# Patient Record
Sex: FEMALE | Race: WHITE | Employment: OTHER | URBAN - METROPOLITAN AREA
[De-identification: names, ages, dates, MRNs, and addresses within clinical notes are randomized per-mention and may not be internally consistent; named-entity substitution may affect disease eponyms.]

---

## 2018-01-01 ENCOUNTER — INITIAL CONSULT (OUTPATIENT)
Dept: CARDIOTHORACIC SURGERY | Age: 63
End: 2018-01-01
Payer: COMMERCIAL

## 2018-01-01 ENCOUNTER — HOSPITAL ENCOUNTER (INPATIENT)
Age: 63
LOS: 1 days | DRG: 070 | End: 2018-10-06
Attending: EMERGENCY MEDICINE | Admitting: INTERNAL MEDICINE
Payer: COMMERCIAL

## 2018-01-01 ENCOUNTER — TELEPHONE (OUTPATIENT)
Dept: ONCOLOGY | Age: 63
End: 2018-01-01

## 2018-01-01 ENCOUNTER — HOSPITAL ENCOUNTER (OUTPATIENT)
Dept: RADIATION ONCOLOGY | Age: 63
Discharge: HOME | End: 2018-09-05
Payer: COMMERCIAL

## 2018-01-01 ENCOUNTER — TELEPHONE (OUTPATIENT)
Dept: RADIATION ONCOLOGY | Age: 63
End: 2018-01-01

## 2018-01-01 ENCOUNTER — HOSPITAL ENCOUNTER (OUTPATIENT)
Age: 63
Discharge: HOME | End: 2018-05-08
Payer: COMMERCIAL

## 2018-01-01 ENCOUNTER — HOSPITAL ENCOUNTER (OUTPATIENT)
Dept: RADIATION ONCOLOGY | Age: 63
Discharge: HOME | End: 2018-09-12
Payer: COMMERCIAL

## 2018-01-01 ENCOUNTER — HOSPITAL ENCOUNTER (OUTPATIENT)
Dept: RADIATION ONCOLOGY | Age: 63
Discharge: HOME | End: 2018-08-23
Payer: COMMERCIAL

## 2018-01-01 ENCOUNTER — HOSPITAL ENCOUNTER (OUTPATIENT)
Dept: RADIATION ONCOLOGY | Age: 63
Discharge: HOME | End: 2018-08-24
Payer: COMMERCIAL

## 2018-01-01 ENCOUNTER — HOSPITAL ENCOUNTER (OUTPATIENT)
Dept: RADIATION ONCOLOGY | Age: 63
Discharge: HOME | End: 2018-08-29
Payer: COMMERCIAL

## 2018-01-01 ENCOUNTER — HOSPITAL ENCOUNTER (OUTPATIENT)
Dept: RADIATION ONCOLOGY | Age: 63
Discharge: HOME | End: 2018-09-03
Payer: COMMERCIAL

## 2018-01-01 ENCOUNTER — APPOINTMENT (OUTPATIENT)
Dept: CT IMAGING | Age: 63
DRG: 070 | End: 2018-01-01
Payer: COMMERCIAL

## 2018-01-01 ENCOUNTER — TELEPHONE (OUTPATIENT)
Dept: INFUSION THERAPY | Age: 63
End: 2018-01-01

## 2018-01-01 ENCOUNTER — APPOINTMENT (OUTPATIENT)
Dept: GENERAL RADIOLOGY | Age: 63
DRG: 070 | End: 2018-01-01
Payer: COMMERCIAL

## 2018-01-01 ENCOUNTER — HOSPITAL ENCOUNTER (OUTPATIENT)
Age: 63
Discharge: HOME | End: 2018-04-10
Payer: COMMERCIAL

## 2018-01-01 VITALS
OXYGEN SATURATION: 98 % | TEMPERATURE: 97.5 F | DIASTOLIC BLOOD PRESSURE: 84 MMHG | BODY MASS INDEX: 19.74 KG/M2 | SYSTOLIC BLOOD PRESSURE: 120 MMHG | WEIGHT: 115 LBS | HEART RATE: 92 BPM

## 2018-01-01 VITALS
RESPIRATION RATE: 22 BRPM | SYSTOLIC BLOOD PRESSURE: 107 MMHG | HEIGHT: 65 IN | TEMPERATURE: 98.4 F | DIASTOLIC BLOOD PRESSURE: 70 MMHG | HEART RATE: 110 BPM | BODY MASS INDEX: 20.89 KG/M2 | OXYGEN SATURATION: 80 % | WEIGHT: 125.4 LBS

## 2018-01-01 VITALS
SYSTOLIC BLOOD PRESSURE: 108 MMHG | DIASTOLIC BLOOD PRESSURE: 70 MMHG | HEART RATE: 100 BPM | WEIGHT: 111.5 LBS | BODY MASS INDEX: 19.75 KG/M2

## 2018-01-01 VITALS
OXYGEN SATURATION: 98 % | TEMPERATURE: 97.6 F | SYSTOLIC BLOOD PRESSURE: 104 MMHG | HEART RATE: 95 BPM | WEIGHT: 112 LBS | BODY MASS INDEX: 19.84 KG/M2 | DIASTOLIC BLOOD PRESSURE: 76 MMHG

## 2018-01-01 VITALS
TEMPERATURE: 97.5 F | HEART RATE: 80 BPM | WEIGHT: 114 LBS | BODY MASS INDEX: 19.57 KG/M2 | SYSTOLIC BLOOD PRESSURE: 120 MMHG | OXYGEN SATURATION: 98 % | DIASTOLIC BLOOD PRESSURE: 82 MMHG

## 2018-01-01 VITALS
BODY MASS INDEX: 19.84 KG/M2 | SYSTOLIC BLOOD PRESSURE: 131 MMHG | HEIGHT: 63 IN | HEART RATE: 83 BPM | WEIGHT: 112 LBS | DIASTOLIC BLOOD PRESSURE: 86 MMHG

## 2018-01-01 VITALS
OXYGEN SATURATION: 96 % | SYSTOLIC BLOOD PRESSURE: 124 MMHG | HEART RATE: 92 BPM | DIASTOLIC BLOOD PRESSURE: 86 MMHG | TEMPERATURE: 97.9 F

## 2018-01-01 DIAGNOSIS — C80.1 CANCER (HCC): Primary | ICD-10-CM

## 2018-01-01 DIAGNOSIS — R62.7 FAILURE TO THRIVE IN ADULT: ICD-10-CM

## 2018-01-01 DIAGNOSIS — C34.91 MALIGNANT NEOPLASM OF RIGHT LUNG, UNSPECIFIED PART OF LUNG (HCC): Primary | ICD-10-CM

## 2018-01-01 DIAGNOSIS — J90 PLEURAL EFFUSION: Primary | ICD-10-CM

## 2018-01-01 DIAGNOSIS — E44.0 MODERATE PROTEIN-CALORIE MALNUTRITION (HCC): Primary | ICD-10-CM

## 2018-01-01 DIAGNOSIS — M62.81 GENERALIZED MUSCLE WEAKNESS: ICD-10-CM

## 2018-01-01 DIAGNOSIS — E86.0 DEHYDRATION: ICD-10-CM

## 2018-01-01 DIAGNOSIS — G93.40 ACUTE ENCEPHALOPATHY: ICD-10-CM

## 2018-01-01 LAB
ALBUMIN SERPL-MCNC: 1.8 G/DL (ref 3.5–5.2)
ALBUMIN SERPL-MCNC: 2 G/DL (ref 3.5–5.2)
ALBUMIN SERPL-MCNC: 2.9 G/DL (ref 3.5–5.2)
ALP BLD-CCNC: 104 U/L (ref 35–104)
ALP BLD-CCNC: 107 U/L (ref 35–104)
ALP BLD-CCNC: 75 U/L (ref 35–104)
ALT SERPL-CCNC: 15 U/L (ref 0–32)
ALT SERPL-CCNC: 15 U/L (ref 0–32)
ALT SERPL-CCNC: 35 U/L (ref 0–32)
AMMONIA: 31.1 UMOL/L (ref 11–51)
ANION GAP SERPL CALCULATED.3IONS-SCNC: 11 MMOL/L (ref 7–16)
ANION GAP SERPL CALCULATED.3IONS-SCNC: 7 MMOL/L (ref 7–16)
ANION GAP SERPL CALCULATED.3IONS-SCNC: 9 MMOL/L (ref 7–16)
ANION GAP SERPL CALCULATED.3IONS-SCNC: 9 MMOL/L (ref 7–16)
AST SERPL-CCNC: 12 U/L (ref 0–31)
AST SERPL-CCNC: 16 U/L (ref 0–31)
AST SERPL-CCNC: 39 U/L (ref 0–31)
B.E.: -1.2 MMOL/L (ref -3–3)
BACTERIA: ABNORMAL /HPF
BACTERIA: NORMAL /HPF
BASOPHILS ABSOLUTE: 0 E9/L (ref 0–0.2)
BASOPHILS ABSOLUTE: 0.03 E9/L (ref 0–0.2)
BASOPHILS RELATIVE PERCENT: 0 % (ref 0–2)
BASOPHILS RELATIVE PERCENT: 0.6 % (ref 0–2)
BILIRUB SERPL-MCNC: 0.5 MG/DL (ref 0–1.2)
BILIRUB SERPL-MCNC: 0.9 MG/DL (ref 0–1.2)
BILIRUB SERPL-MCNC: 0.9 MG/DL (ref 0–1.2)
BILIRUBIN URINE: NEGATIVE
BILIRUBIN URINE: NEGATIVE
BLOOD, URINE: NEGATIVE
BLOOD, URINE: NEGATIVE
BUN BLDV-MCNC: 23 MG/DL (ref 8–23)
BUN BLDV-MCNC: 24 MG/DL (ref 8–23)
BUN BLDV-MCNC: 27 MG/DL (ref 8–23)
BUN BLDV-MCNC: 32 MG/DL (ref 8–23)
BURR CELLS: ABNORMAL
CALCIUM SERPL-MCNC: 8.1 MG/DL (ref 8.6–10.2)
CALCIUM SERPL-MCNC: 8.6 MG/DL (ref 8.6–10.2)
CALCIUM SERPL-MCNC: 8.8 MG/DL (ref 8.6–10.2)
CALCIUM SERPL-MCNC: 8.8 MG/DL (ref 8.6–10.2)
CHLORIDE BLD-SCNC: 108 MMOL/L (ref 98–107)
CHLORIDE BLD-SCNC: 109 MMOL/L (ref 98–107)
CHLORIDE BLD-SCNC: 99 MMOL/L (ref 98–107)
CHLORIDE BLD-SCNC: 99 MMOL/L (ref 98–107)
CLARITY: CLEAR
CLARITY: CLEAR
CO2: 21 MMOL/L (ref 22–29)
CO2: 25 MMOL/L (ref 22–29)
CO2: 30 MMOL/L (ref 22–29)
CO2: 31 MMOL/L (ref 22–29)
COHB: 1.3 % (ref 0–1.5)
COLOR: YELLOW
COLOR: YELLOW
CREAT SERPL-MCNC: 0.7 MG/DL (ref 0.5–1)
CREAT SERPL-MCNC: 0.8 MG/DL (ref 0.5–1)
CREAT SERPL-MCNC: 1.5 MG/DL (ref 0.5–1)
CREAT SERPL-MCNC: 1.7 MG/DL (ref 0.5–1)
CREATININE URINE: 256 MG/DL (ref 29–226)
CRITICAL: ABNORMAL
DATE ANALYZED: ABNORMAL
DATE OF COLLECTION: ABNORMAL
EOSINOPHILS ABSOLUTE: 0.06 E9/L (ref 0.05–0.5)
EOSINOPHILS ABSOLUTE: 0.16 E9/L (ref 0.05–0.5)
EOSINOPHILS RELATIVE PERCENT: 1 % (ref 0–6)
EOSINOPHILS RELATIVE PERCENT: 2.9 % (ref 0–6)
GFR AFRICAN AMERICAN: 37
GFR AFRICAN AMERICAN: 42
GFR AFRICAN AMERICAN: >60
GFR AFRICAN AMERICAN: >60
GFR NON-AFRICAN AMERICAN: 30 ML/MIN/1.73
GFR NON-AFRICAN AMERICAN: 35 ML/MIN/1.73
GFR NON-AFRICAN AMERICAN: >60 ML/MIN/1.73
GFR NON-AFRICAN AMERICAN: >60 ML/MIN/1.73
GLUCOSE BLD-MCNC: 74 MG/DL (ref 74–109)
GLUCOSE BLD-MCNC: 77 MG/DL (ref 74–109)
GLUCOSE BLD-MCNC: 79 MG/DL (ref 74–109)
GLUCOSE BLD-MCNC: 97 MG/DL (ref 74–109)
GLUCOSE URINE: NEGATIVE MG/DL
GLUCOSE URINE: NEGATIVE MG/DL
HCO3: 22.3 MMOL/L (ref 22–26)
HCT VFR BLD CALC: 38.8 % (ref 34–48)
HCT VFR BLD CALC: 39.1 % (ref 34–48)
HCT VFR BLD CALC: 44.7 % (ref 34–48)
HEMOGLOBIN: 12.1 G/DL (ref 11.5–15.5)
HEMOGLOBIN: 12.6 G/DL (ref 11.5–15.5)
HEMOGLOBIN: 14.2 G/DL (ref 11.5–15.5)
HHB: 3.7 % (ref 0–5)
IMMATURE GRANULOCYTES #: 0.03 E9/L
IMMATURE GRANULOCYTES %: 0.6 % (ref 0–5)
INR BLD: 1.5
KETONES, URINE: ABNORMAL MG/DL
KETONES, URINE: NEGATIVE MG/DL
LAB: ABNORMAL
LACTIC ACID: 0.8 MMOL/L (ref 0.5–2.2)
LEUKOCYTE ESTERASE, URINE: ABNORMAL
LEUKOCYTE ESTERASE, URINE: ABNORMAL
LYMPHOCYTES ABSOLUTE: 0.12 E9/L (ref 1.5–4)
LYMPHOCYTES ABSOLUTE: 0.67 E9/L (ref 1.5–4)
LYMPHOCYTES RELATIVE PERCENT: 12.3 % (ref 20–42)
LYMPHOCYTES RELATIVE PERCENT: 2 % (ref 20–42)
Lab: ABNORMAL
MAGNESIUM: 2 MG/DL (ref 1.6–2.6)
MAGNESIUM: 2.1 MG/DL (ref 1.6–2.6)
MAGNESIUM: 2.4 MG/DL (ref 1.6–2.6)
MCH RBC QN AUTO: 29.5 PG (ref 26–35)
MCH RBC QN AUTO: 29.6 PG (ref 26–35)
MCH RBC QN AUTO: 30.6 PG (ref 26–35)
MCHC RBC AUTO-ENTMCNC: 31.2 % (ref 32–34.5)
MCHC RBC AUTO-ENTMCNC: 31.8 % (ref 32–34.5)
MCHC RBC AUTO-ENTMCNC: 32.2 % (ref 32–34.5)
MCV RBC AUTO: 92.7 FL (ref 80–99.9)
MCV RBC AUTO: 94.9 FL (ref 80–99.9)
MCV RBC AUTO: 94.9 FL (ref 80–99.9)
METER GLUCOSE: 65 MG/DL (ref 70–110)
METHB: 0.3 % (ref 0–1.5)
MODE: ABNORMAL
MONOCYTES ABSOLUTE: 0.18 E9/L (ref 0.1–0.95)
MONOCYTES ABSOLUTE: 0.78 E9/L (ref 0.1–0.95)
MONOCYTES RELATIVE PERCENT: 14.3 % (ref 2–12)
MONOCYTES RELATIVE PERCENT: 3 % (ref 2–12)
MYELOCYTE PERCENT: 1 % (ref 0–0)
NEUTROPHILS ABSOLUTE: 3.78 E9/L (ref 1.8–7.3)
NEUTROPHILS ABSOLUTE: 5.55 E9/L (ref 1.8–7.3)
NEUTROPHILS RELATIVE PERCENT: 69.3 % (ref 43–80)
NEUTROPHILS RELATIVE PERCENT: 93 % (ref 43–80)
NITRITE, URINE: NEGATIVE
NITRITE, URINE: NEGATIVE
O2 CONTENT: 18.7 ML/DL
O2 SATURATION: 96.2 % (ref 92–98.5)
O2HB: 94.7 % (ref 94–97)
OPERATOR ID: 789
PATIENT TEMP: 37 C
PCO2: 34 MMHG (ref 35–45)
PDW BLD-RTO: 13.8 FL (ref 11.5–15)
PDW BLD-RTO: 14.3 FL (ref 11.5–15)
PDW BLD-RTO: 14.3 FL (ref 11.5–15)
PH BLOOD GAS: 7.43 (ref 7.35–7.45)
PH UA: 5.5 (ref 5–9)
PH UA: 6 (ref 5–9)
PHOSPHORUS: 3.3 MG/DL (ref 2.5–4.5)
PHOSPHORUS: 3.4 MG/DL (ref 2.5–4.5)
PHOSPHORUS: 3.9 MG/DL (ref 2.5–4.5)
PLATELET # BLD: 135 E9/L (ref 130–450)
PLATELET # BLD: 150 E9/L (ref 130–450)
PLATELET # BLD: 180 E9/L (ref 130–450)
PMV BLD AUTO: 11.4 FL (ref 7–12)
PMV BLD AUTO: 11.6 FL (ref 7–12)
PMV BLD AUTO: 13.2 FL (ref 7–12)
PO2: 84 MMHG (ref 60–100)
POIKILOCYTES: ABNORMAL
POTASSIUM SERPL-SCNC: 2.9 MMOL/L (ref 3.5–5)
POTASSIUM SERPL-SCNC: 4 MMOL/L (ref 3.5–5)
POTASSIUM SERPL-SCNC: 4.5 MMOL/L (ref 3.5–5)
POTASSIUM SERPL-SCNC: 4.7 MMOL/L (ref 3.5–5)
PREALBUMIN: 3 MG/DL (ref 20–40)
PROCALCITONIN: 3.57 NG/ML (ref 0–0.08)
PROTEIN PROTEIN: 56 MG/DL (ref 0–12)
PROTEIN UA: ABNORMAL MG/DL
PROTEIN UA: ABNORMAL MG/DL
PROTEIN/CREAT RATIO: 0.2
PROTEIN/CREAT RATIO: 0.2 (ref 0–0.2)
PROTHROMBIN TIME: 16.6 SEC (ref 9.3–12.4)
RBC # BLD: 4.09 E12/L (ref 3.5–5.5)
RBC # BLD: 4.12 E12/L (ref 3.5–5.5)
RBC # BLD: 4.82 E12/L (ref 3.5–5.5)
RBC UA: ABNORMAL /HPF (ref 0–2)
RBC UA: NORMAL /HPF (ref 0–2)
SODIUM BLD-SCNC: 138 MMOL/L (ref 132–146)
SODIUM BLD-SCNC: 139 MMOL/L (ref 132–146)
SODIUM BLD-SCNC: 140 MMOL/L (ref 132–146)
SODIUM BLD-SCNC: 141 MMOL/L (ref 132–146)
SOURCE, BLOOD GAS: ABNORMAL
SPECIFIC GRAVITY UA: 1.02 (ref 1–1.03)
SPECIFIC GRAVITY UA: 1.02 (ref 1–1.03)
THB: 14 G/DL (ref 11.5–16.5)
TIME ANALYZED: 1730
TOTAL PROTEIN: 4.6 G/DL (ref 6.4–8.3)
TOTAL PROTEIN: 5 G/DL (ref 6.4–8.3)
TOTAL PROTEIN: 5.6 G/DL (ref 6.4–8.3)
TROPONIN: <0.01 NG/ML (ref 0–0.03)
URIC ACID, SERUM: 3 MG/DL (ref 2.4–5.7)
URIC ACID, SERUM: 5.8 MG/DL (ref 2.4–5.7)
UROBILINOGEN, URINE: 0.2 E.U./DL
UROBILINOGEN, URINE: 0.2 E.U./DL
WBC # BLD: 5.5 E9/L (ref 4.5–11.5)
WBC # BLD: 5.9 E9/L (ref 4.5–11.5)
WBC # BLD: 7.8 E9/L (ref 4.5–11.5)
WBC UA: ABNORMAL /HPF (ref 0–5)
WBC UA: NORMAL /HPF (ref 0–5)

## 2018-01-01 PROCEDURE — 6370000000 HC RX 637 (ALT 250 FOR IP): Performed by: INTERNAL MEDICINE

## 2018-01-01 PROCEDURE — 84484 ASSAY OF TROPONIN QUANT: CPT

## 2018-01-01 PROCEDURE — 85025 COMPLETE CBC W/AUTO DIFF WBC: CPT

## 2018-01-01 PROCEDURE — 77417 THER RADIOLOGY PORT IMAGE(S): CPT

## 2018-01-01 PROCEDURE — 80053 COMPREHEN METABOLIC PANEL: CPT

## 2018-01-01 PROCEDURE — 84145 PROCALCITONIN (PCT): CPT

## 2018-01-01 PROCEDURE — 99205 OFFICE O/P NEW HI 60 MIN: CPT

## 2018-01-01 PROCEDURE — 84134 ASSAY OF PREALBUMIN: CPT

## 2018-01-01 PROCEDURE — 99999 PR OFFICE/OUTPT VISIT,PROCEDURE ONLY: CPT | Performed by: INTERNAL MEDICINE

## 2018-01-01 PROCEDURE — 36415 COLL VENOUS BLD VENIPUNCTURE: CPT

## 2018-01-01 PROCEDURE — 77336 RADIATION PHYSICS CONSULT: CPT

## 2018-01-01 PROCEDURE — 83735 ASSAY OF MAGNESIUM: CPT

## 2018-01-01 PROCEDURE — 3017F COLORECTAL CA SCREEN DOC REV: CPT | Performed by: THORACIC SURGERY (CARDIOTHORACIC VASCULAR SURGERY)

## 2018-01-01 PROCEDURE — 36600 WITHDRAWAL OF ARTERIAL BLOOD: CPT

## 2018-01-01 PROCEDURE — 77412 RADIATION TX DELIVERY LVL 3: CPT

## 2018-01-01 PROCEDURE — 6360000002 HC RX W HCPCS: Performed by: INTERNAL MEDICINE

## 2018-01-01 PROCEDURE — 93005 ELECTROCARDIOGRAM TRACING: CPT | Performed by: EMERGENCY MEDICINE

## 2018-01-01 PROCEDURE — 84100 ASSAY OF PHOSPHORUS: CPT

## 2018-01-01 PROCEDURE — 99285 EMERGENCY DEPT VISIT HI MDM: CPT

## 2018-01-01 PROCEDURE — 87040 BLOOD CULTURE FOR BACTERIA: CPT

## 2018-01-01 PROCEDURE — 81001 URINALYSIS AUTO W/SCOPE: CPT

## 2018-01-01 PROCEDURE — 85610 PROTHROMBIN TIME: CPT

## 2018-01-01 PROCEDURE — 82962 GLUCOSE BLOOD TEST: CPT

## 2018-01-01 PROCEDURE — 99999 PR OFFICE/OUTPT VISIT,PROCEDURE ONLY: CPT | Performed by: RADIOLOGY

## 2018-01-01 PROCEDURE — 77334 RADIATION TREATMENT AID(S): CPT

## 2018-01-01 PROCEDURE — 70450 CT HEAD/BRAIN W/O DYE: CPT

## 2018-01-01 PROCEDURE — 84550 ASSAY OF BLOOD/URIC ACID: CPT

## 2018-01-01 PROCEDURE — G8420 CALC BMI NORM PARAMETERS: HCPCS | Performed by: THORACIC SURGERY (CARDIOTHORACIC VASCULAR SURGERY)

## 2018-01-01 PROCEDURE — 2580000003 HC RX 258: Performed by: EMERGENCY MEDICINE

## 2018-01-01 PROCEDURE — 82570 ASSAY OF URINE CREATININE: CPT

## 2018-01-01 PROCEDURE — 2700000000 HC OXYGEN THERAPY PER DAY

## 2018-01-01 PROCEDURE — 2060000000 HC ICU INTERMEDIATE R&B

## 2018-01-01 PROCEDURE — 84156 ASSAY OF PROTEIN URINE: CPT

## 2018-01-01 PROCEDURE — 77290 THER RAD SIMULAJ FIELD CPLX: CPT

## 2018-01-01 PROCEDURE — 87088 URINE BACTERIA CULTURE: CPT

## 2018-01-01 PROCEDURE — 77307 TELETHX ISODOSE PLAN CPLX: CPT

## 2018-01-01 PROCEDURE — 83605 ASSAY OF LACTIC ACID: CPT

## 2018-01-01 PROCEDURE — 1036F TOBACCO NON-USER: CPT | Performed by: THORACIC SURGERY (CARDIOTHORACIC VASCULAR SURGERY)

## 2018-01-01 PROCEDURE — 99214 OFFICE O/P EST MOD 30 MIN: CPT | Performed by: THORACIC SURGERY (CARDIOTHORACIC VASCULAR SURGERY)

## 2018-01-01 PROCEDURE — 80048 BASIC METABOLIC PNL TOTAL CA: CPT

## 2018-01-01 PROCEDURE — 99205 OFFICE O/P NEW HI 60 MIN: CPT | Performed by: RADIOLOGY

## 2018-01-01 PROCEDURE — 2580000003 HC RX 258: Performed by: INTERNAL MEDICINE

## 2018-01-01 PROCEDURE — 82140 ASSAY OF AMMONIA: CPT

## 2018-01-01 PROCEDURE — 85027 COMPLETE CBC AUTOMATED: CPT

## 2018-01-01 PROCEDURE — 99291 CRITICAL CARE FIRST HOUR: CPT | Performed by: INTERNAL MEDICINE

## 2018-01-01 PROCEDURE — G8427 DOCREV CUR MEDS BY ELIG CLIN: HCPCS | Performed by: THORACIC SURGERY (CARDIOTHORACIC VASCULAR SURGERY)

## 2018-01-01 PROCEDURE — 82805 BLOOD GASES W/O2 SATURATION: CPT

## 2018-01-01 PROCEDURE — G8598 ASA/ANTIPLAT THER USED: HCPCS | Performed by: THORACIC SURGERY (CARDIOTHORACIC VASCULAR SURGERY)

## 2018-01-01 PROCEDURE — 71045 X-RAY EXAM CHEST 1 VIEW: CPT

## 2018-01-01 RX ORDER — MEGESTROL ACETATE 40 MG/ML
400 SUSPENSION ORAL EVERY MORNING
COMMUNITY

## 2018-01-01 RX ORDER — ACETAMINOPHEN 650 MG/1
650 SUPPOSITORY RECTAL EVERY 4 HOURS PRN
Status: DISCONTINUED | OUTPATIENT
Start: 2018-01-01 | End: 2018-01-01 | Stop reason: HOSPADM

## 2018-01-01 RX ORDER — LEVETIRACETAM 500 MG/1
500 TABLET ORAL 2 TIMES DAILY
COMMUNITY

## 2018-01-01 RX ORDER — 0.9 % SODIUM CHLORIDE 0.9 %
1000 INTRAVENOUS SOLUTION INTRAVENOUS ONCE
Status: COMPLETED | OUTPATIENT
Start: 2018-01-01 | End: 2018-01-01

## 2018-01-01 RX ORDER — SODIUM CHLORIDE 0.9 % (FLUSH) 0.9 %
10 SYRINGE (ML) INJECTION PRN
Status: CANCELLED | OUTPATIENT
Start: 2018-01-01

## 2018-01-01 RX ORDER — ANTIOX #8/OM3/DHA/EPA/LUT/ZEAX 250-2.5 MG
1 CAPSULE ORAL EVERY MORNING
COMMUNITY

## 2018-01-01 RX ORDER — SODIUM CHLORIDE 0.9 % (FLUSH) 0.9 %
10 SYRINGE (ML) INJECTION PRN
Status: DISCONTINUED | OUTPATIENT
Start: 2018-01-01 | End: 2018-01-01 | Stop reason: HOSPADM

## 2018-01-01 RX ORDER — 0.9 % SODIUM CHLORIDE 0.9 %
10 VIAL (ML) INJECTION PRN
Status: CANCELLED | OUTPATIENT
Start: 2018-01-01

## 2018-01-01 RX ORDER — ACETAMINOPHEN 325 MG/1
650 TABLET ORAL EVERY 4 HOURS PRN
Status: CANCELLED | OUTPATIENT
Start: 2018-01-01

## 2018-01-01 RX ORDER — LORAZEPAM 0.5 MG/1
0.5 TABLET ORAL EVERY 6 HOURS PRN
Status: DISCONTINUED | OUTPATIENT
Start: 2018-01-01 | End: 2018-01-01 | Stop reason: HOSPADM

## 2018-01-01 RX ORDER — ACETAMINOPHEN 325 MG/1
650 TABLET ORAL EVERY 4 HOURS PRN
Status: DISCONTINUED | OUTPATIENT
Start: 2018-01-01 | End: 2018-01-01 | Stop reason: HOSPADM

## 2018-01-01 RX ORDER — 0.9 % SODIUM CHLORIDE 0.9 %
10 VIAL (ML) INJECTION EVERY 12 HOURS SCHEDULED
Status: CANCELLED | OUTPATIENT
Start: 2018-01-01

## 2018-01-01 RX ORDER — DEXAMETHASONE 2 MG/1
2 TABLET ORAL 2 TIMES DAILY WITH MEALS
COMMUNITY
Start: 2018-01-01 | End: 2018-01-01

## 2018-01-01 RX ORDER — DEXAMETHASONE SODIUM PHOSPHATE 10 MG/ML
10 INJECTION INTRAMUSCULAR; INTRAVENOUS ONCE
Status: COMPLETED | OUTPATIENT
Start: 2018-01-01 | End: 2018-01-01

## 2018-01-01 RX ORDER — DEXTROSE AND SODIUM CHLORIDE 5; .45 G/100ML; G/100ML
INJECTION, SOLUTION INTRAVENOUS CONTINUOUS
Status: DISCONTINUED | OUTPATIENT
Start: 2018-01-01 | End: 2018-01-01

## 2018-01-01 RX ORDER — SODIUM CHLORIDE 0.9 % (FLUSH) 0.9 %
10 SYRINGE (ML) INJECTION EVERY 12 HOURS SCHEDULED
Status: DISCONTINUED | OUTPATIENT
Start: 2018-01-01 | End: 2018-01-01

## 2018-01-01 RX ORDER — ONDANSETRON 2 MG/ML
4 INJECTION INTRAMUSCULAR; INTRAVENOUS EVERY 6 HOURS PRN
Status: DISCONTINUED | OUTPATIENT
Start: 2018-01-01 | End: 2018-01-01 | Stop reason: HOSPADM

## 2018-01-01 RX ORDER — SODIUM CHLORIDE 0.9 % (FLUSH) 0.9 %
10 SYRINGE (ML) INJECTION EVERY 12 HOURS SCHEDULED
Status: CANCELLED | OUTPATIENT
Start: 2018-01-01

## 2018-01-01 RX ORDER — MORPHINE SULFATE 2 MG/ML
2 INJECTION, SOLUTION INTRAMUSCULAR; INTRAVENOUS EVERY 4 HOURS PRN
Status: DISCONTINUED | OUTPATIENT
Start: 2018-01-01 | End: 2018-01-01 | Stop reason: HOSPADM

## 2018-01-01 RX ADMIN — MORPHINE SULFATE 2 MG: 2 INJECTION, SOLUTION INTRAMUSCULAR; INTRAVENOUS at 02:19

## 2018-01-01 RX ADMIN — SODIUM CHLORIDE 1000 ML: 9 INJECTION, SOLUTION INTRAVENOUS at 11:37

## 2018-01-01 RX ADMIN — MORPHINE SULFATE 2 MG: 2 INJECTION, SOLUTION INTRAMUSCULAR; INTRAVENOUS at 22:13

## 2018-01-01 RX ADMIN — MORPHINE SULFATE 2 MG: 2 INJECTION, SOLUTION INTRAMUSCULAR; INTRAVENOUS at 06:24

## 2018-01-01 RX ADMIN — DEXAMETHASONE SODIUM PHOSPHATE 10 MG: 10 INJECTION INTRAMUSCULAR; INTRAVENOUS at 17:47

## 2018-01-01 RX ADMIN — DEXTROSE AND SODIUM CHLORIDE: 5; 450 INJECTION, SOLUTION INTRAVENOUS at 17:14

## 2018-01-01 RX ADMIN — LORAZEPAM 0.5 MG: 0.5 TABLET ORAL at 00:53

## 2018-01-01 ASSESSMENT — ENCOUNTER SYMPTOMS
SHORTNESS OF BREATH: 0
BACK PAIN: 0
WHEEZING: 0
COUGH: 0
COUGH: 0
NAUSEA: 0
ABDOMINAL PAIN: 0
BLOOD IN STOOL: 0
SHORTNESS OF BREATH: 0
VOMITING: 0

## 2018-01-01 ASSESSMENT — PAIN SCALES - WONG BAKER
WONGBAKER_NUMERICALRESPONSE: 0

## 2018-01-01 ASSESSMENT — PAIN SCALES - GENERAL
PAINLEVEL_OUTOF10: 0
PAINLEVEL_OUTOF10: 0

## 2018-08-23 NOTE — PROGRESS NOTES
Radiation Oncology      Lizz Leon. Olimpia Monterroso  [address] [place] [address]  [phone]               Referring Physician: Dr. Marcela Crump      Primary Care Physician:Eliceo Gonzalez DO   Primary Oncologist: Dr. Marcela Crump      Diagnosis: met lung CA      Service:  Radiation Oncology consultation performed on 8/23/18        HPI:        Caryn Randall is a very pleasant 58year old woman with a long history of metastatic lung cancer. She has done remarkably well for years under the care of Dr. Avis Goncalves - see [address] [place] records scanned into Media tab. She has new symptomatic brain mets and after a detailed review of the course to date will sim urgently to start a 15 faction palliative course. Dizzy with HA.  KPS 70 but barely.           Past Medical History:   Diagnosis Date    Aortic regurgitation     mild - moderate    CAD (coronary artery disease)     Cancer (HCC)     bone,lung and lymph nodes    CRI (chronic renal insufficiency)     Hypercalcemia     Hypercholesteremia     Hypertension     Ischemic cardiomyopathy     Lung cancer ([address]     MI (myocardial infarction) ([address] 11/09    Mitral regurgitation     mild    Mitral regurgitation     mild to moderate         Past Surgical History:   Procedure Laterality Date    ABDOMEN SURGERY      CAESARIAN SECTION    DIAGNOSTIC CARDIAC CATH LAB PROCEDURE  11/09    ECHOCARDIOGRAM LIMITED  4/1/2015         HERNIA REPAIR      PARACENTESIS      THORACOSCOPY Right 6/5/2015    VATS, Pericardial Window, SIMONE         Family History   Problem Relation Age of Onset    Heart Disease Mother     Cancer Father          Current Outpatient Prescriptions   Medication Sig Dispense Refill    calcitRIOL (ROCALTROL) 0.25 MCG capsule Take 0.25 mcg by mouth every other day       carvedilol (COREG) 25 MG tablet TAKE 1/2 TABLET BY MOUTH TWICE DAILY  12    ciclopirox (LOPROX) 0.77 % cream APPLY TWICE DAILY TO FEET  3    ALPRAZolam (XANAX) 0.5 MG tablet TAKE 1 TABLET BY MOUTH 3 TIMES A DAY AS NEEDED FOR ANXIETY  1    atorvastatin (LIPITOR) 20 MG tablet Take 20 mg by mouth daily   12    denosumab (XGEVA) 120 MG/1.7ML SOLN SC injection Inject 120 mg into the skin once Indications: every 4 weeks Currently using for 5 mths.  Multiple Vitamins-Minerals (PRESERVISION AREDS PO) Take  by mouth 2 times daily.  levothyroxine (SYNTHROID) 25 MCG tablet Take 25 mcg by mouth every evening.  Vitamin D (CHOLECALCIFEROL) 1000 UNIT CAPS capsule Take 50,000 Units by mouth once a week       Calcium Carbonate-Vitamin D (CALTRATE 600+D PO) Take 600 mg by mouth 2 times daily.  aspirin 81 MG EC tablet Take 81 mg by mouth daily.  clopidogrel (PLAVIX) 75 MG tablet Take 75 mg by mouth daily.  dexamethasone (DECADRON) 2 MG tablet Take 2 mg by mouth 2 times daily (with meals)      levETIRAcetam (KEPPRA) 500 MG tablet Take 500 mg by mouth 2 times daily      vitamin D (ERGOCALCIFEROL) 52540 UNITS CAPS capsule TAKE 1 CAPSULE BY MOUTH ONCE A WEEK  5    furosemide (LASIX) 80 MG tablet Take 80 mg by mouth 2 times daily Pt alternates between 80 mg daily and 120mg daily      crizotinib (XALKORI) 200 MG capsule Take 240 mg by mouth 2 times daily       No current facility-administered medications for this encounter. Allergies   Allergen Reactions    Prednisone Itching         Social History     Social History    Marital status:      Spouse name: N/A    Number of children: N/A    Years of education: N/A     Occupational History    retired       Social History Main Topics    Smoking status: Former Smoker     Packs/day: 0.75     Years: 15.00    Smokeless tobacco: Never Used      Comment: quit about 30 years ago    Alcohol use No    Drug use: No    Sexual activity: No     Other Topics Concern    None     Social History Narrative    Lives in [facility] with  Sosa Hernandez and son Sosa Hernandez.          Review of Systems - History obtained from chart review and the patient  General ROS: positive for  - weight loss and pre-syncope  Psychological ROS: negative  Ophthalmic ROS: negative  ENT ROS: negative  Allergy and Immunology ROS: negative  Hematological and Lymphatic ROS: negative  Endocrine ROS: negative  Breast ROS: negative for breast lumps  Respiratory ROS: +cough  Cardiovascular ROS: no chest pain or dyspnea on exertion  Gastrointestinal ROS: no abdominal pain, change in bowel habits, or black or bloody stools  Genito-Urinary ROS: no dysuria, trouble voiding, or hematuria  Musculoskeletal ROS: negative  Neurological ROS: no TIA or stroke symptoms  Dermatological ROS: negative        Physical Exam   Constitutional: She is oriented to person, place, and time and well-developed, well-nourished, and in no distress. HENT:   Head: Normocephalic and atraumatic. Eyes: Pupils are equal, round, and reactive to light. Conjunctivae and EOM are normal.   Neck: Normal range of motion. Neck supple. Cardiovascular: Normal rate. No murmur heard. Pulmonary/Chest: Breath sounds normal. No respiratory distress. She has no wheezes. She has no rales. She exhibits no tenderness. Abdominal: Soft. Bowel sounds are normal. She exhibits no distension. Musculoskeletal: Normal range of motion. She exhibits no edema, tenderness or deformity. Neurological: She is alert and oriented to person, place, and time. Skin: Skin is warm and dry. Psychiatric: Mood, memory, affect and judgment normal.         Imaging reviewed:        JAMES LARA 8/23/18:  -diffuse brain mets.         Radiation Safety and Treatment Support:  -previous Radiation history: No  -history of connective tissue disease: No  -history of autoimmune disease: No  -pregnant: not applicable  -nutrition consult prior to [address]: Yes  -PEG: No  -Dental evaluation prior to treatment:Yes  -Social Work requested: Yes  -Oncology Nurse navigator requested: Yes  -pre + post treatment PT /

## 2018-08-23 NOTE — PROGRESS NOTES
lost?                                        a. 0.5-5.0(1)                                  b. >5.0-10.0 (2)                                  c. >10.0-15.0 (3)                                  D. >15.0-20.0 (4) 4        2. Have you been eating poorly because of a decreased appetite? No (0)                                        Yes (1)   1    3. Do you have a diagnosis of head and neck cancer? A. Yes (1)  B. No (0)  1   TOTAL 8      4. If score 0 or 1 not at risk of malnutrition                  >/=2 at risk of malnutrition, see below          Score of 0-1: No action  Score 2 or greater:  1. For Non-Diabetic Patient: Recommend adding Ensure Complete  2xdaily and provide              patient with Ensure wellness bag with coupons; For Diabetic Patient, Recommend adding Glucerna Shake 2xdaily and provide patient with Glucerna Wellness bag with coupons  2. Route to the dietitian via [facility]    · Are you having  difficulty performing daily routine tasks  due to fatigue or weakness (ie: bathing/showering, dressing, housework, meal prep, work, child Mort Hora): Yes     · Do you have any arm flexibility/ROM restrictions, swelling or pain that limit activity: No     · Any changes in memory, attention/focus that impact daily activities: Yes     · Do you avoid participation in leisure/social activity due weakness, fatigue or pain: Yes     IF ANY OF THE ABOVE ARE ANSWERED YES:   a. Referral request for OT sent to NP? YES  · If NO, then why:    b. NP Name: Beverly Robles        PT [place] [id number]    · Have you had any recent falls in past 2 months: Yes     · Do you have difficulty  going up/down stairs: Yes     · Are you having difficulty walking: Yes     · Do you often hold onto furniture/environmental supports or feel off balance when you are walking: Yes     · Do you need to take rest breaks when you are walking:  Yes     · Any pain on scale of 1-10 that limits your mobility: No 0/10    IF ANY OF THE ABOVE ARE ANSWERED YES:   a. Referral request for PT sent to NP? Yes   · If NO, then why:    b. NP Name: Vidhi Abi         Distress Screening Assessment   1. Completed by the patient? Yes, and family members  2. Reviewed by RN? Yes  3. Triggers met for immediate intervention (score of 6 or more)? Yes   If Yes: a. What intervention provided: PT/OT/Dietician    b. Referral made to ? No           Patient education given on radiation therapy to the brain. The patient expresses understanding and acceptance of instructions.  Faustino Cross 8/23/2018 3:23 PM           Faustino Cross

## 2018-08-28 NOTE — TELEPHONE ENCOUNTER
Patient's  requested to see me today prior to her radiation therapy appointment. They had questions regarding the patient's energy level. He stated that her energy level is very low and that she has been given vitamin B12 injections in the past by Dr Maurilio Bell. She has also had IV nutrition/albumin per the . He wanted to know if she should have those things again to help with her energy. I reviewed information about radiation and fatigue. Encouraged the patient to drink and eat enough throughout the day and to rest as needed. They will see Dr Rupali Mendoza for an office visit tomorrow after radiation treatment. They have an appointment with Dr Maurilio Bell on 7/6/18 and will discuss the options of B12 injections with him. They were appreciative of visit. Natasha Ayala RN updated on patient fatigue issues. Will continue to follow.

## 2018-09-12 NOTE — PATIENT INSTRUCTIONS
Continue daily fractionated radiation therapy as scheduled. Please see weekly OTV note and intial consultation letter in [facility] for clinical details. Sreedhar Malone.  Juan Miguel Mccann MD [address] [place] [address]  Radiation Oncology  [place]:  [phone]   FAX:    [phone]: [address] [place] [address] Road:  [phone]

## 2018-09-17 NOTE — PROGRESS NOTES
Lisette Garcia Jose  9/11/2018  Wt Readings from Last 10 Encounters:   09/05/18 114 lb (51.7 kg)   08/29/18 115 lb (52.2 kg)   02/01/18 138 lb (62.6 kg)   11/02/17 144 lb (65.3 kg)   11/02/16 149 lb (67.6 kg)   04/21/16 146 lb (66.2 kg)   10/23/15 130 lb 12.8 oz (59.3 kg)   06/16/15 122 lb (55.3 kg)   06/05/15 124 lb (56.2 kg)   06/02/15 127 lb (57.6 kg)     Ht Readings from Last 1 Encounters:   02/01/18 5' 4\" (1.626 m)     There is no height or weight on file to calculate BMI. Met with patient today per referral. Patient was accompanied by her . Patient states she is doing fair, she notes improving appetite in the past week. Her dose of Decadron was also increased. She states that she usually eats two meals per day, its a medium portion. She is being encouraged to follow a very healthy diet by her son and daughter, her son is a dietitian. She states that he cooks lean meats, veggies, some dairy and very little sugar. Patient is drinking [employer] protein shake twice daily, she drinks this along with her meals. She also tries to eat a protein bar once daily, usually a [employer]. Patient has lost 20# in the past 9mo. She knows that she cannot afford to lose any more weight. Reviewed with patient the need to force herself good habits with eating, trying to set some routines for snacks in between meals. Patient reports that she is trying her best but she will try more. Also encouraged her to be liberal with added fats to foods, such as peanut butter, oils or cream. She was receptive. Provided with contact information with any questions they may have in the future. Weight change: 20# loss in 9mo (14%)  Appetite: fair  N/V/D/C: nausea at times  Calculated Needs if applicable: [id number] (52x30-32), 68gm PRO (52x1.3), 1600ml (52x30-32)    Pre-Hab Eligible?: no        Recommendations: [employer] Protein shake BID to provide 300kcal, 30gm PRO. Add more snacks between meals.            [facility] GUIDELINES FOR CLINICAL
by mouth daily. No current facility-administered medications for this encounter. OBJECTIVE:     Wt Readings from Last 3 Encounters:   09/12/18 112 lb (50.8 kg)   09/11/18 112 lb (50.8 kg)   09/05/18 114 lb (51.7 kg)     Alert, sitting upright in wheelchair. Pleasant and conversant. Irradiated skin shows no change. ASSESSMENT/PLAN:     Patient completed WBRT today. Patient's current medications reviewed. Decadron taper till discontinued. Patient has 2 mg tablets- currently taking 4 mg daily (2- 2 mg tablets). Instructed to take 4 mg daily 9/17, 9/18 and 9/19. Instructed to take 2 mg daily 9/20, 9/21 and 9/22. Instructed to take 1 mg daily 9/23, 9/24 and 9/25 then discontinue. Discussed Decadron taper in detail with patient and . Provided handwritten instruction on discharge paperwork of above. Hair care discussed. Okay to wash with baby shampoo. Use spray on hair detangler spray brush with soft brush. Inform  detangler spray available at [employer] (i.e. [address] [place] [address]) in shampoo aisle. Questions answered to apparent satisfaction. Patient scheduled for 3 week symptom management follow-up visit with CNP. Appointment card provided.        Whitney Plaza, MSN, RN, APRN-CNP  Certified Nurse Practitioner for [address]         P: [phone]/ F: [phone]

## 2018-10-05 PROBLEM — R62.51 FAILURE TO THRIVE (0-17): Status: ACTIVE | Noted: 2018-01-01

## 2018-10-05 PROBLEM — C79.31 BRAIN METASTASIS (HCC): Status: ACTIVE | Noted: 2018-01-01

## 2018-10-05 PROBLEM — R62.7 FAILURE TO THRIVE IN ADULT: Status: ACTIVE | Noted: 2018-01-01

## 2018-10-05 NOTE — PROGRESS NOTES
Dr. Tylor Addison called via [facility] to speak with Dr. Marla Soares / Alex Lakhani 10/5/18 6:49 PM

## 2018-10-05 NOTE — ED NOTES
Bed: 17  Expected date:   Expected time:   Means of arrival:   Comments:  Ems weakness     Emily Morse RN  10/05/18 8423

## 2018-10-05 NOTE — ED PROVIDER NOTES
a 11.25 pack-year smoking history. She has never used smokeless tobacco. She reports that she does not drink alcohol or use drugs. Family History: family history includes Cancer (age of onset: 71) in her father; Cancer (age of onset: [de-identified]) in her paternal grandmother; Heart Disease in her mother. The patients home medications have been reviewed.     Allergies: Prednisone    -------------------------------------------------- RESULTS -------------------------------------------------    LABS:  Results for orders placed or performed during the hospital encounter of 10/05/18   Troponin   Result Value Ref Range    Troponin <0.01 0.00 - 0.03 ng/mL   Lactic Acid, Plasma   Result Value Ref Range    Lactic Acid 0.8 0.5 - 2.2 mmol/L   CBC Auto Differential   Result Value Ref Range    WBC 5.9 4.5 - 11.5 E9/L    RBC 4.09 3.50 - 5.50 E12/L    Hemoglobin 12.1 11.5 - 15.5 g/dL    Hematocrit 38.8 34.0 - 48.0 %    MCV 94.9 80.0 - 99.9 fL    MCH 29.6 26.0 - 35.0 pg    MCHC 31.2 (L) 32.0 - 34.5 %    RDW 14.3 11.5 - 15.0 fL    Platelets 455 909 - 904 E9/L    MPV 11.6 7.0 - 12.0 fL    Neutrophils % 93.0 (H) 43.0 - 80.0 %    Lymphocytes % 2.0 (L) 20.0 - 42.0 %    Monocytes % 3.0 2.0 - 12.0 %    Eosinophils % 1.0 0.0 - 6.0 %    Basophils % 0.0 0.0 - 2.0 %    Neutrophils # 5.55 1.80 - 7.30 E9/L    Lymphocytes # 0.12 (L) 1.50 - 4.00 E9/L    Monocytes # 0.18 0.10 - 0.95 E9/L    Eosinophils # 0.06 0.05 - 0.50 E9/L    Basophils # 0.00 0.00 - 0.20 E9/L    Myelocytes Relative 1.0 0 - 0 %    Poikilocytes 2+     Cristobal Cells 2+    Comprehensive Metabolic Panel   Result Value Ref Range    Sodium 140 132 - 146 mmol/L    Potassium 4.0 3.5 - 5.0 mmol/L    Chloride 108 (H) 98 - 107 mmol/L    CO2 21 (L) 22 - 29 mmol/L    Anion Gap 11 7 - 16 mmol/L    Glucose 74 74 - 109 mg/dL    BUN 24 (H) 8 - 23 mg/dL    CREATININE 0.7 0.5 - 1.0 mg/dL    GFR Non-African American >60 >=60 mL/min/1.73    GFR African American >60     Calcium 8.8 8.6 - 10.2 mg/dL Total Protein 4.6 (L) 6.4 - 8.3 g/dL    Alb 1.8 (L) 3.5 - 5.2 g/dL    Total Bilirubin 0.9 0.0 - 1.2 mg/dL    Alkaline Phosphatase 104 35 - 104 U/L    ALT 15 0 - 32 U/L    AST 12 0 - 31 U/L   Urinalysis with Microscopic   Result Value Ref Range    Color, UA Yellow Straw/Yellow    Clarity, UA Clear Clear    Glucose, Ur Negative Negative mg/dL    Bilirubin Urine Negative Negative    Ketones, Urine Negative Negative mg/dL    Specific Gravity, UA 1.020 1.005 - 1.030    Blood, Urine Negative Negative    pH, UA 5.5 5.0 - 9.0    Protein, UA TRACE Negative mg/dL    Urobilinogen, Urine 0.2 <2.0 E.U./dL    Nitrite, Urine Negative Negative    Leukocyte Esterase, Urine TRACE (A) Negative    WBC, UA 1-3 0 - 5 /HPF    RBC, UA 0-1 0 - 2 /HPF    Bacteria, UA NONE /HPF   Protime-INR   Result Value Ref Range    Protime 16.6 (H) 9.3 - 12.4 sec    INR 1.5    Phosphorus   Result Value Ref Range    Phosphorus 3.3 2.5 - 4.5 mg/dL   Uric acid   Result Value Ref Range    Uric Acid, Serum 3.0 2.4 - 5.7 mg/dL   Magnesium   Result Value Ref Range    Magnesium 2.0 1.6 - 2.6 mg/dL   EKG 12 Lead   Result Value Ref Range    Ventricular Rate 117 BPM    Atrial Rate 117 BPM    P-R Interval 144 ms    QRS Duration 96 ms    Q-T Interval 334 ms    QTc Calculation (Bazett) 465 ms    P Axis 25 degrees    R Axis -52 degrees    T Axis 90 degrees       RADIOLOGY:  CT Head WO Contrast   Final Result      No acute intracranial hemorrhage. Numerous hyperdense parenchymal nodules and masses, many of which are   calcified or partially calcified, in the bilateral cerebral and   cerebellar hemispheres and putamen, most compatible with metastatic   disease, possibly of breast cancer origin. These are new since the   prior study of 8/17/2013. Cysticercosis infection may also cause   calcified parenchymal nodules. However, the presence of larger masses   with vasogenic edema are more consistent with metastases. Clinical   correlation is recommended. Contrast-enhanced MRI is recommended for   further evaluation. Chronic microvascular ischemic disease. Mild to moderate cerebral atrophy, worse compared to the prior study. XR CHEST PORTABLE   Final Result      1. Low lung volumes challenge evaluation. 2. Bibasilar airspace disease and moderate to large pleural fluid   collections. EKG: This EKG is signed and interpreted by me. Rate: 117  Rhythm: Sinus  Interpretation: sinus tachycardia  Comparison: stable as compared to patient's most recent EKG      ------------------------- NURSING NOTES AND VITALS REVIEWED ---------------------------  Date / Time Roomed:  10/5/2018  9:22 AM  ED Bed Assignment:  17/17    The nursing notes within the ED encounter and vital signs as below have been reviewed. Patient Vitals for the past 24 hrs:   BP Temp Temp src Pulse Resp SpO2 Height Weight   10/05/18 1525 111/79 98.6 °F (37 °C) Oral 119 18 97 % - -   10/05/18 1252 137/87 - - 118 20 92 % - -   10/05/18 1134 112/81 - - 114 16 92 % - -   10/05/18 0930 - - - - - - 5' 5\" (1.651 m) 112 lb (50.8 kg)   10/05/18 0928 121/81 97.4 °F (36.3 °C) Oral 117 16 92 % - -       Oxygen Saturation Interpretation: Normal    ------------------------------------------ PROGRESS NOTES ------------------------------------------  Re-evaluation(s):  Time: 5080  Patients symptoms show no change  Repeat physical examination is not changed    Counseling:  I have spoken with the patient and discussed todays results, in addition to providing specific details for the plan of care and counseling regarding the diagnosis and prognosis. Their questions are answered at this time and they are agreeable with the plan of admission.    --------------------------------- ADDITIONAL PROVIDER NOTES ---------------------------------  Consultations:  Time: 1520. Spoke with Dr. Fariba Frey. Discussed case. They will admit the patient.   This patient's ED course included: a personal history

## 2018-10-06 NOTE — H&P
Medications Prior to Admission:    Prior to Admission medications    Medication Sig Start Date End Date Taking? Authorizing Provider   megestrol acetate (MEGACE) 400 MG/10ML SUSP Take 400 mg by mouth every morning    Yes Historical Provider, MD   Multiple Vitamins-Minerals (PRESERVISION AREDS 2) CAPS Take 1 capsule by mouth every morning   Yes Historical Provider, MD   levETIRAcetam (KEPPRA) 500 MG tablet Take 500 mg by mouth 2 times daily   Yes Historical Provider, MD   carvedilol (COREG) 25 MG tablet TAKE 1/2 TABLET BY MOUTH TWICE DAILY 8/26/16  Yes Historical Provider, MD   ALPRAZolam (XANAX) 0.5 MG tablet TAKE 1 TABLET BY MOUTH 3 TIMES A DAY AS NEEDED FOR ANXIETY 10/24/16  Yes Historical Provider, MD   atorvastatin (LIPITOR) 20 MG tablet Take 20 mg by mouth every morning  7/31/15  Yes Historical Provider, MD   denosumab (XGEVA) 120 MG/1.7ML SOLN SC injection Inject 120 mg into the skin every 28 days Currently using for 3 mths. Yes Historical Provider, MD   levothyroxine (SYNTHROID) 25 MCG tablet Take 25 mcg by mouth every evening. Yes Historical Provider, MD   aspirin 81 MG EC tablet Take 81 mg by mouth every morning    Yes Historical Provider, MD   clopidogrel (PLAVIX) 75 MG tablet Take 75 mg by mouth every morning    Yes Historical Provider, MD       Allergies:    Lactose intolerance (gi) and Prednisone    Social History:    reports that she quit smoking about 30 years ago. Her smoking use included Cigarettes. She started smoking about 45 years ago. She has a 11.25 pack-year smoking history. She has never used smokeless tobacco. She reports that she does not drink alcohol or use drugs.     Family History:   Family History   Problem Relation Age of Onset    Heart Disease Mother     Diabetes Mother     Cancer Father 71        colon adenocarcinoma    Colon Cancer Father     Heart Failure Father     Cancer Brother     Brain Cancer Brother     Cancer Paternal Grandmother [de-identified]        colon     images:  153 Indication: Altered mental status Comparison: Head CTs dated 8/17/2013 and 3/4/2011. Technique:  Computed tomography of the head was performed without intravenous contrast. Images were constructed in the axial plane. Multiplanar reformation of the axial images was performed in coronal and sagittal planes. Low-dose CT acquisition technique included one of following options: (1) automated exposure control;  (2) adjustment of mA and/or kV according to patient's size; or (3) use of iterative reconstruction. FINDINGS: There are numerous calcified or partially calcified parenchymal nodules throughout the bilateral cerebral and cerebellar hemispheres and putamen, new since the prior study. There is an inhomogeneous hyperdense mass in the right frontal lobe measuring approximately 18 mm x 14 mm (series 3 image 26) with surrounding hypoattenuation compatible with vasogenic edema. A more homogeneous hyperdense mass is seen in the right parietal lobe measuring approximately 17 mm x 19 mm x 18 mm with surrounding vasogenic edema. There appears to be an isodense dural based mass at the anterior pole of the left temporal lobe measuring approximately 25 mm x 11 mm (series 3 image 14). No acute intracranial hemorrhage is seen. There are patchy and confluent areas of decreased attenuation in the bilateral periventricular and deep subcortical white matter, suggestive of chronic microvascular ischemia. Atherosclerotic calcification of the bilateral internal carotid arteries is noted. There is mild to moderate cerebral atrophy with associated ventricular prominence, worse compared to the prior study. No calvarial lesions are identified. The paranasal sinuses and mastoid air cells are patent. No acute intracranial hemorrhage.  Numerous hyperdense parenchymal nodules and masses, many of which are calcified or partially calcified, in the bilateral cerebral and cerebellar hemispheres and putamen, most compatible with what information was presented to me including what was brought to my attention with the hem/onc on call and neurology. Discussed that if her current status suggests possible infection that this may also negatively impact her treatment course and survival.  Discussed options of care which included transporting downtown and further eval and tx of pt or stay here and have palliative care and/or hospice see pt. Discussed getting the opinion of the hem/onc while in hospital.   Time given for questions and all questions answered.  and family present states that she would want to be comfortable and that she would not want to die in a hospital. Pt wanting to take her home with hospice. Again, offered support and that if they wanted to have further input from hem/onc that we could still do that.  and family again stating that hospice is what is best at this time. Care discussed with nursing staff. During this time period, I contacted the admitting doctor twice. First time informing him of rrt and if can provide any additional information. What the admitting physician provided to me is what was already documented by admitting with no other additions . Admitting doctor was not presented other information from the ER other than what was provided in his note. Second call was that pt was going to be made hospice. At this time, admitting physician thanked me for my time and asked/offered for me to take over the case which I accepted. Hospice has been consulted. Medications ordered for symptom management and pt made dnr-cc. Pt to go home once appropriate arrangements made.     Critical care time is 90 min          Electronically signed by Jr Bingham DO on 10/5/2018 at 8:58 PM

## 2018-10-06 NOTE — PLAN OF CARE
Problem: Falls - Risk of:  Goal: Will remain free from falls  Will remain free from falls   Outcome: Met This Shift    Goal: Absence of physical injury  Absence of physical injury   Outcome: Met This Shift      Problem: Risk for Impaired Skin Integrity  Goal: Tissue integrity - skin and mucous membranes  Structural intactness and normal physiological function of skin and  mucous membranes.    Outcome: Met This Shift      Problem: Nutrition Deficit:  Goal: Ability to achieve adequate nutritional intake will improve  Ability to achieve adequate nutritional intake will improve   Outcome: Met This Shift      Problem: Pain Control  Goal: Maintain pain level at or below patient's acceptable level (or 5 if patient is unable to determine acceptable level)  Outcome: Met This Shift      Problem: Neurological  Goal: Maximum potential motor/sensory/cognitive function  Outcome: Met This Shift      Problem: Nutrition  Goal: Optimal nutrition therapy  Outcome: Met This Shift      Problem: Skin Integrity/Risk  Goal: No skin breakdown during hospitalization  Outcome: Met This Shift

## 2018-10-06 NOTE — PROGRESS NOTES
[facility] returned call and said they will send out a liaison to talk with the family in the morning.

## 2018-10-08 LAB
EKG ATRIAL RATE: 117 BPM
EKG P AXIS: 25 DEGREES
EKG P-R INTERVAL: 144 MS
EKG Q-T INTERVAL: 334 MS
EKG QRS DURATION: 96 MS
EKG QTC CALCULATION (BAZETT): 465 MS
EKG R AXIS: -52 DEGREES
EKG T AXIS: 90 DEGREES
EKG VENTRICULAR RATE: 117 BPM
URINE CULTURE, ROUTINE: NORMAL

## 2018-10-10 LAB — BLOOD CULTURE, ROUTINE: NORMAL

## 2022-01-03 NOTE — PROGRESS NOTES
Chest x-ray shows slight worsening of the upper lobe infiltrate. Finished the antibiotics and will need to get a follow-up chest x-ray once completed. Schedule follow-up chest x-ray in 2 weeks. Go to ER if her symptoms get worse.
Gera Nelson Jose  9/5/2018  Wt Readings from Last 3 Encounters:   09/05/18 114 lb (51.7 kg)   08/29/18 115 lb (52.2 kg)   02/01/18 138 lb (62.6 kg)     Body mass index is 19.57 kg/m². Treatment Area:brain     Patient was seen today for weekly visit. Comfort Alteration  KPS:60%  Fatigue: Moderate      CNS Alteration  Depressed Level of Consciousness:alert & oriented x4  Orientation: time, place and person time, place and person time, place and person  Neuropathy-Motor: na  Ataxia: Present  Speech Impairment: no  Seizures: no  Urinary Incontinence: No  Bowel Incontinence: No  Insomnia: Yes    Sensory Alteration: na    Nutritional Alteration  Anorexia: No   Nausea: No   Vomiting: No    Skin Alteration   Sensation:na    Radiation Dermatitis:  na    Alopecia: No    Emotional  Coping: effective    Injury, potential bleeding or infection: na          Lab Results   Component Value Date    GLUCOSE 77 05/08/2018       /82   Pulse 80   Temp 97.5 °F (36.4 °C) (Oral)   Wt 114 lb (51.7 kg)   SpO2 98%   BMI 19.57 kg/m²   BP within normal range? yes         Assessment/Plan: Patient has received 7/15 fractions to whole brain, 1750cGy/3750-patient wants to exercise-has been doing 3 steps at a time with a two-person assist from her  and son-per , patient is extremely weak and unsteady-nursing discussed possible adverse effects of what a fall at this time for her could entail-suggested strength training upper body with light weight of a soup can in an effort to gain strength-appears patient is lestening intently to suggestions.      Ayden Pineda
is tolerating treatments well with expected toxicities. RBA were reviewed prior to first fraction and PRN. Current and planned dose reviewed. Goals of treatment and potential side effects were reviewed with the patient PRN. Treatment imaging has been personally reviewed for accuracy and precision. Questions answered to apparent satisfaction. Treatments will continue as planned. -DEX to 2 mg AM , 2 mg mid afternoon  -RBA reviewed. Shorty Meza.  Alcira Larry MD MS DABR  Radiation Oncologist        [facility] ([address] [place] [address]): [phone] /// FAX: [phone]  [facility]): [phone] /// FAX: [phone]  [address] [place] [address]): [phone] /// FAX: [phone]